# Patient Record
Sex: FEMALE | Race: ASIAN | Employment: FULL TIME | URBAN - METROPOLITAN AREA
[De-identification: names, ages, dates, MRNs, and addresses within clinical notes are randomized per-mention and may not be internally consistent; named-entity substitution may affect disease eponyms.]

---

## 2021-02-05 ENCOUNTER — HOSPITAL ENCOUNTER (EMERGENCY)
Age: 51
Discharge: HOME | End: 2021-02-05
Attending: STUDENT IN AN ORGANIZED HEALTH CARE EDUCATION/TRAINING PROGRAM | Admitting: STUDENT IN AN ORGANIZED HEALTH CARE EDUCATION/TRAINING PROGRAM
Payer: COMMERCIAL

## 2021-02-05 ENCOUNTER — APPOINTMENT (OUTPATIENT)
Dept: GENERAL RADIOLOGY | Age: 51
End: 2021-02-05
Attending: STUDENT IN AN ORGANIZED HEALTH CARE EDUCATION/TRAINING PROGRAM
Payer: COMMERCIAL

## 2021-02-05 VITALS
TEMPERATURE: 98 F | SYSTOLIC BLOOD PRESSURE: 118 MMHG | WEIGHT: 149.03 LBS | DIASTOLIC BLOOD PRESSURE: 71 MMHG | RESPIRATION RATE: 16 BRPM | OXYGEN SATURATION: 100 % | HEART RATE: 75 BPM

## 2021-02-05 DIAGNOSIS — S52.125A CLOSED NONDISPLACED FRACTURE OF HEAD OF LEFT RADIUS, INITIAL ENCOUNTER: Primary | ICD-10-CM

## 2021-02-05 DIAGNOSIS — S62.235A CLOSED NONDISPLACED FRACTURE OF BASE OF FIRST METACARPAL BONE OF LEFT HAND, UNSPECIFIED FRACTURE MORPHOLOGY, INITIAL ENCOUNTER: ICD-10-CM

## 2021-02-05 PROCEDURE — 73110 X-RAY EXAM OF WRIST: CPT

## 2021-02-05 PROCEDURE — 73080 X-RAY EXAM OF ELBOW: CPT

## 2021-02-05 PROCEDURE — 75810000053 HC SPLINT APPLICATION

## 2021-02-05 PROCEDURE — 99284 EMERGENCY DEPT VISIT MOD MDM: CPT

## 2021-02-05 PROCEDURE — 74011250637 HC RX REV CODE- 250/637: Performed by: STUDENT IN AN ORGANIZED HEALTH CARE EDUCATION/TRAINING PROGRAM

## 2021-02-05 RX ORDER — HYDROCODONE BITARTRATE AND ACETAMINOPHEN 5; 325 MG/1; MG/1
1 TABLET ORAL ONCE
Status: COMPLETED | OUTPATIENT
Start: 2021-02-05 | End: 2021-02-05

## 2021-02-05 RX ORDER — HYDROCODONE BITARTRATE AND ACETAMINOPHEN 5; 325 MG/1; MG/1
1 TABLET ORAL
Qty: 12 TAB | Refills: 0 | Status: SHIPPED | OUTPATIENT
Start: 2021-02-05 | End: 2021-02-08

## 2021-02-05 RX ORDER — IBUPROFEN 600 MG/1
600 TABLET ORAL
Status: COMPLETED | OUTPATIENT
Start: 2021-02-05 | End: 2021-02-05

## 2021-02-05 RX ADMIN — IBUPROFEN 600 MG: 600 TABLET, FILM COATED ORAL at 13:22

## 2021-02-05 RX ADMIN — HYDROCODONE BITARTRATE AND ACETAMINOPHEN 1 TABLET: 5; 325 TABLET ORAL at 14:37

## 2021-02-05 NOTE — ED NOTES
Patient  discharged with instructions per Dr Brad Hector. Instructions reviewed with pt and her daughter by phone.

## 2021-02-05 NOTE — LETTER
[facility] EMERGENCY DEPT 
[address] [facility] [place] [id number] [phone] Work/School Note Date: 2/5/2021 To Whom It May concern: 
 
Sari De Leon was seen and treated today in the emergency room by the following provider(s): 
Attending Provider: Crista Garcia MD.   
 
Sari De Leon may return to work on 02/06/21 with limited activity. She is nonweightbearing with the left upper extremity until cleared by orthopedics. Will need to follow-up with orthopedics next week. Sincerely, Aliya oH MD

## 2021-02-05 NOTE — ED TRIAGE NOTES
Pt presents to ED after GLF about 1100 this am. Pt slipped on floor. Pt with c/o pain in left elbow. The pt denies other injuries.

## 2021-02-05 NOTE — CONSULTS
ORTHO:    Telephone consult with Dr. Josh Moreno who describes a minimally displaced L radial head fracture and possible L 1st MC fracture. NVI per ER MD, closed. Recommend Thumb spica splint L hand and sling and follow up with Dr. Donna Mancuso, call tomorrow for appointment. Dr. Andrews Chinchilla involved with case with ER Provider and agrees with plan. Thank you for allowing us to take part in this patients care. Lisa Sahu PA-C  Orthopaedic Surgery [address]  Page through hospital  with any questions.

## 2021-02-05 NOTE — ED PROVIDER NOTES
The history is provided by the patient. The history is limited by a language barrier.  used: iPad  Guy. Fall  The accident occurred 1 to 2 hours ago. Fall occurred: Slip and fall while walking. Distance fallen: Ground-level. She landed on hard floor. Point of impact: Left arm and left leg. Pain location: Left hand at the base of the thumb as well as left elbow. The pain is severe. She was ambulatory at the scene. There was no drug use involved in the accident. There was no alcohol use involved in the accident. Pertinent negatives include no visual change, no fever, no numbness, no abdominal pain, no bowel incontinence, no nausea, no headaches, no loss of consciousness, no tingling and no laceration. Elbow Pain   Pertinent negatives include no numbness, no tingling and no back pain. History reviewed. No pertinent past medical history. Past Surgical History:   Procedure Laterality Date    HX CHOLECYSTECTOMY      HX GYN           History reviewed. No pertinent family history.     Social History     Socioeconomic History    Marital status:      Spouse name: Not on file    Number of children: Not on file    Years of education: Not on file    Highest education level: Not on file   Occupational History    Not on file   Social Needs    Financial resource strain: Not on file    Food insecurity     Worry: Not on file     Inability: Not on file    Transportation needs     Medical: Not on file     Non-medical: Not on file   Tobacco Use    Smoking status: Never Smoker    Smokeless tobacco: Never Used   Substance and Sexual Activity    Alcohol use: Never     Frequency: Never    Drug use: Not on file    Sexual activity: Not on file   Lifestyle    Physical activity     Days per week: Not on file     Minutes per session: Not on file    Stress: Not on file   Relationships    Social connections     Talks on phone: Not on file     Gets together: Not on file     Attends [employer] service: Not on file     Active member of club or organization: Not on file     Attends meetings of clubs or organizations: Not on file     Relationship status: Not on file    Intimate partner violence     Fear of current or ex partner: Not on file     Emotionally abused: Not on file     Physically abused: Not on file     Forced sexual activity: Not on file   Other Topics Concern    Not on file   Social History Narrative    Not on file         ALLERGIES: Patient has no known allergies. Review of Systems   Constitutional: Positive for fatigue. Negative for chills and fever. Eyes: Negative for photophobia. Respiratory: Negative for shortness of breath. Cardiovascular: Negative for chest pain. Gastrointestinal: Negative for abdominal pain, bowel incontinence and nausea. Genitourinary: Negative for dysuria. Musculoskeletal: Positive for arthralgias. Negative for back pain. Neurological: Negative for tingling, loss of consciousness, numbness and headaches. Psychiatric/Behavioral: Negative for confusion. All other systems reviewed and are negative. Vitals:    02/05/21 1301   BP: 123/78   Pulse: 75   Resp: 16   Temp: 98 °F (36.7 °C)   SpO2: 100%   Weight: 67.6 kg (149 lb 0.5 oz)            Physical Exam  Vitals signs and nursing note reviewed. Constitutional:       General: She is not in acute distress. Appearance: She is well-developed. HENT:      Head: Normocephalic and atraumatic. Mouth/Throat:      Mouth: Mucous membranes are moist.      Pharynx: Oropharynx is clear. No oropharyngeal exudate. Eyes:      Pupils: Pupils are equal, round, and reactive to light. Cardiovascular:      Rate and Rhythm: Normal rate and regular rhythm. Heart sounds: Normal heart sounds. Pulmonary:      Effort: Pulmonary effort is normal.      Breath sounds: Normal breath sounds. Chest:      Chest wall: No tenderness.    Abdominal:      General: There is no distension. Palpations: Abdomen is soft. Tenderness: There is no abdominal tenderness. There is no guarding or rebound. Musculoskeletal: Normal range of motion. General: No deformity. Left elbow: She exhibits no deformity. Tenderness found. Radial head tenderness noted. Left wrist: She exhibits no tenderness. Left hand: She exhibits tenderness ( Base of thumb). Normal sensation noted. Decreased sensation is not present in the ulnar distribution, is not present in the medial redistribution and is not present in the radial distribution. Normal strength noted. She exhibits no finger abduction, no thumb/finger opposition and no wrist extension trouble. Hands:       Comments: Entire spine palpated, no tenderness or deformity. Skin:     General: Skin is warm and dry. Capillary Refill: Capillary refill takes less than 2 seconds. Findings: No laceration. Neurological:      General: No focal deficit present. Mental Status: She is alert and oriented to person, place, and time. Psychiatric:         Mood and Affect: Mood normal.          JIMENA Carrington was evaluated in the Emergency Department on 2/5/2021 for the symptoms described in the history of present illness. He/she was evaluated in the context of the global COVID-19 pandemic, which necessitated consideration that the patient might be at risk for infection with the SARS-CoV-2 virus that causes COVID-19. Institutional protocols and algorithms that pertain to the evaluation of patients at risk for COVID-19 are in a state of rapid change based on information released by regulatory bodies including the CDC and federal and state organizations. These policies and algorithms were followed during the patient's care in the ED.     Surrogate Decision Maker: Extended Emergency Contact Information  Primary Emergency Contact: [address] Phone: [phone]  Relation: Daughter  Preferred language: ENGLISH   needed? No    MEDICAL DECISION MAKIN y.o. female with no significant past medical history presents with wrist and elbow pain  Differential diagnosis includes but not limited to: Fracture, dislocation, laceration, contusion, dislocation    LABORATORY TESTS:  Labs Reviewed - No data to display    IMAGING RESULTS:  XR ELBOW LT MIN 3 V   Final Result   Probable nondisplaced fracture of the radial head with mild   impaction. XR WRIST LT AP/LAT/OBL MIN 3V   Final Result   Ossific densities around the first [facility] joint with severe joint space   narrowing. These may represent osteophytes or loose bodies. Fracture is not   entirely excluded. Recommend correlation for point tenderness in this region. EKG:  none completed    MEDICATIONS GIVEN:  Medications   HYDROcodone-acetaminophen (NORCO) 5-325 mg per tablet 1 Tab (has no administration in time range)   ibuprofen (MOTRIN) tablet 600 mg (600 mg Oral Given 21 1322)       CONSULTS:  2:15 PM Ortho: Discussed over [employer] messaging. We will follow-up with Dr. Ellie Edmonds. PROGRESS NOTE:   The patient's ED course has been uncomplicated    IMPRESSION:  1. Closed nondisplaced fracture of head of left radius, initial encounter    2. Closed nondisplaced fracture of base of first metacarpal bone of left hand, unspecified fracture morphology, initial encounter        PLAN:  -   Discharge  Discharge Medication List as of 2021  2:19 PM      START taking these medications    Details   HYDROcodone-acetaminophen (Norco) 5-325 mg per tablet Take 1 Tab by mouth every six (6) hours as needed for Pain for up to 3 days.  Max Daily Amount: 4 Tabs., Normal, Disp-12 Tab, R-0           Follow-up Information     Follow up With Specialties Details Why Contact Info    Sheila Cobian MD Orthopedic Surgery Schedule an appointment as soon as possible for a visit   [address] [facility]  [address] [place] [address]  [phone] Return precautions given    CONDITION:  good      Aliya Ho MD         Procedures

## 2023-01-07 ENCOUNTER — HOSPITAL ENCOUNTER (EMERGENCY)
Age: 53
Discharge: HOME | End: 2023-01-07
Attending: STUDENT IN AN ORGANIZED HEALTH CARE EDUCATION/TRAINING PROGRAM

## 2023-01-07 ENCOUNTER — APPOINTMENT (OUTPATIENT)
Dept: GENERAL RADIOLOGY | Age: 53
End: 2023-01-07
Attending: STUDENT IN AN ORGANIZED HEALTH CARE EDUCATION/TRAINING PROGRAM

## 2023-01-07 VITALS
BODY MASS INDEX: 28.68 KG/M2 | WEIGHT: 151.9 LBS | HEART RATE: 65 BPM | TEMPERATURE: 98.4 F | SYSTOLIC BLOOD PRESSURE: 133 MMHG | RESPIRATION RATE: 16 BRPM | HEIGHT: 61 IN | DIASTOLIC BLOOD PRESSURE: 84 MMHG | OXYGEN SATURATION: 98 %

## 2023-01-07 DIAGNOSIS — S63.502A SPRAIN OF LEFT WRIST, INITIAL ENCOUNTER: Primary | ICD-10-CM

## 2023-01-07 PROCEDURE — 73110 X-RAY EXAM OF WRIST: CPT

## 2023-01-07 PROCEDURE — 99283 EMERGENCY DEPT VISIT LOW MDM: CPT

## 2023-01-07 NOTE — ED TRIAGE NOTES
Pt rpts left wrist pain . Injury one year ago. On Wednesday afternoon she was lifting a basket and had immediate pain. Took Tylenol with some relief.

## 2023-01-07 NOTE — ED NOTES
Translation services utilized by Dr Olga Shaikh and this RN. The patient was discharged home in stable condition, in no respiratory distress and discharge vital signs obtained. The patient's diagnosis, condition and treatment were explained. The patient expressed understanding. No prescriptions given. No work/school note given. A discharge plan has been developed. A  was not involved in the process. Aftercare instructions were given. Pt ambulatory out of the ED with family.

## 2023-01-07 NOTE — ED PROVIDER NOTES
Patient is a 45-year-old female present emergency department for left wrist pain. Patient states that she was at work and on Wednesday she was picking up a basket and immediately started having pain after lifting a basket. Patient had an injury approximately a year and a half ago where she broke the forearm of the left arm. Patient says she is having difficulty gripping secondary to the pain. History reviewed. No pertinent past medical history. Past Surgical History:   Procedure Laterality Date    HX CHOLECYSTECTOMY      HX GYN           History reviewed. No pertinent family history. Social History     Socioeconomic History    Marital status:      Spouse name: Not on file    Number of children: Not on file    Years of education: Not on file    Highest education level: Not on file   Occupational History    Not on file   Tobacco Use    Smoking status: Never    Smokeless tobacco: Never   Substance and Sexual Activity    Alcohol use: Never    Drug use: Not on file    Sexual activity: Not on file   Other Topics Concern    Not on file   Social History Narrative    Not on file     Social Determinants of Health     Financial Resource Strain: Not on file   Food Insecurity: Not on file   Transportation Needs: Not on file   Physical Activity: Not on file   Stress: Not on file   Social Connections: Not on file   Intimate Partner Violence: Not on file   Housing Stability: Not on file         ALLERGIES: Patient has no known allergies. Review of Systems   Musculoskeletal:  Positive for arthralgias. All other systems reviewed and are negative. Vitals:    01/07/23 0739   BP: 134/72   Pulse: 65   Resp: 16   Temp: 98.4 °F (36.9 °C)   SpO2: 99%   Weight: 68.9 kg (151 lb 14.4 oz)   Height: 5' 1\" (1.549 m)            Physical Exam  Vitals and nursing note reviewed. Constitutional:       Appearance: Normal appearance. HENT:      Head: Normocephalic and atraumatic.    Eyes:      Extraocular Movements: Extraocular movements intact. Pupils: Pupils are equal, round, and reactive to light. Cardiovascular:      Rate and Rhythm: Normal rate. Abdominal:      General: Abdomen is flat. Musculoskeletal:      Left wrist: Tenderness and bony tenderness present. No swelling or deformity. Decreased range of motion. Cervical back: Normal range of motion and neck supple. Neurological:      General: No focal deficit present. Mental Status: She is alert and oriented to person, place, and time. Psychiatric:         Mood and Affect: Mood normal.         Behavior: Behavior normal.        Medical Decision Making  Wrist sprain. 69-year-old female presented emergency department after lifting a basket on Wednesday now having difficulty with range of motion and  of her left hand. Patient has tenderness over the lateral aspect of the distal radius tender to touch. Low suspicion for fracture likely ligamentous injury. Amount and/or Complexity of Data Reviewed  Radiology: ordered. Procedures      X-ray shows soft tissue swelling over the lateral aspect of the left wrist.  No acute fracture. Patient will be discharged with orthopedic follow-up.